# Patient Record
Sex: FEMALE
[De-identification: names, ages, dates, MRNs, and addresses within clinical notes are randomized per-mention and may not be internally consistent; named-entity substitution may affect disease eponyms.]

---

## 2023-05-09 ENCOUNTER — APPOINTMENT (OUTPATIENT)
Dept: OTOLARYNGOLOGY | Facility: CLINIC | Age: 78
End: 2023-05-09
Payer: MEDICARE

## 2023-05-09 VITALS
HEIGHT: 65 IN | TEMPERATURE: 97.8 F | SYSTOLIC BLOOD PRESSURE: 154 MMHG | WEIGHT: 175 LBS | DIASTOLIC BLOOD PRESSURE: 66 MMHG | OXYGEN SATURATION: 96 % | BODY MASS INDEX: 29.16 KG/M2 | HEART RATE: 75 BPM

## 2023-05-09 DIAGNOSIS — I63.9 CEREBRAL INFARCTION, UNSPECIFIED: ICD-10-CM

## 2023-05-09 DIAGNOSIS — Z78.9 OTHER SPECIFIED HEALTH STATUS: ICD-10-CM

## 2023-05-09 DIAGNOSIS — I10 ESSENTIAL (PRIMARY) HYPERTENSION: ICD-10-CM

## 2023-05-09 DIAGNOSIS — H93.13 TINNITUS, BILATERAL: ICD-10-CM

## 2023-05-09 DIAGNOSIS — H61.23 IMPACTED CERUMEN, BILATERAL: ICD-10-CM

## 2023-05-09 DIAGNOSIS — H90.3 SENSORINEURAL HEARING LOSS, BILATERAL: ICD-10-CM

## 2023-05-09 DIAGNOSIS — K27.9 PEPTIC ULCER, SITE UNSPECIFIED, UNSPECIFIED AS ACUTE OR CHRONIC, W/OUT HEMORRHAGE OR PERFORATION: ICD-10-CM

## 2023-05-09 DIAGNOSIS — E04.1 NONTOXIC SINGLE THYROID NODULE: ICD-10-CM

## 2023-05-09 DIAGNOSIS — Z82.49 FAMILY HISTORY OF ISCHEMIC HEART DISEASE AND OTHER DISEASES OF THE CIRCULATORY SYSTEM: ICD-10-CM

## 2023-05-09 DIAGNOSIS — E21.3 HYPERPARATHYROIDISM, UNSPECIFIED: ICD-10-CM

## 2023-05-09 PROBLEM — Z00.00 ENCOUNTER FOR PREVENTIVE HEALTH EXAMINATION: Status: ACTIVE | Noted: 2023-05-09

## 2023-05-09 PROCEDURE — 92550 TYMPANOMETRY & REFLEX THRESH: CPT | Mod: 52

## 2023-05-09 PROCEDURE — 92557 COMPREHENSIVE HEARING TEST: CPT

## 2023-05-09 PROCEDURE — 99204 OFFICE O/P NEW MOD 45 MIN: CPT | Mod: 25

## 2023-05-09 RX ORDER — OMEPRAZOLE 20 MG/1
TABLET, ORALLY DISINTEGRATING, DELAYED RELEASE ORAL
Refills: 0 | Status: ACTIVE | COMMUNITY

## 2023-05-09 RX ORDER — NIFEDIPINE 20 MG/1
CAPSULE ORAL
Refills: 0 | Status: ACTIVE | COMMUNITY

## 2023-05-09 RX ORDER — ATORVASTATIN CALCIUM 80 MG/1
TABLET, FILM COATED ORAL
Refills: 0 | Status: ACTIVE | COMMUNITY

## 2023-05-09 RX ORDER — AMITRIPTYLINE HYDROCHLORIDE 10 MG/1
10 TABLET, FILM COATED ORAL
Qty: 30 | Refills: 2 | Status: ACTIVE | COMMUNITY
Start: 2023-05-09 | End: 1900-01-01

## 2023-05-09 RX ORDER — PROPRANOLOL HCL 120 MG
120 CAPSULE, EXTENDED RELEASE 24HR ORAL
Refills: 0 | Status: ACTIVE | COMMUNITY

## 2023-05-09 NOTE — HISTORY OF PRESENT ILLNESS
[de-identified] : Had a cerumen disimpaction AS at an UC a month ago which worsened pre-existing nonpulsatile tinnitus; she also developed pulsatile tinnitus again only on the L. She tried some lipoflavanoid which helped some. She has longstanding faint R sided nonpulsatile tinnitus. She's been evaluated for her tinnitus in the past & offered maskers which she declined. Denies: vertigo, ear pain, drainage, frequent loud noise exp/shooting, frequent infections, hx of ear surgery or IV antibiotics/chemo; denies a FHx of hearing loss. Qtip use: no\par Last 12/22 her PCP carin some blood that the patient says showed HPT but her Ca was normal; she doesn’t have a copy of this. No hx urolithiasis, abdominal pain, depression, etc. No thyroid sono.

## 2023-05-09 NOTE — DATA REVIEWED
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

## 2023-05-09 NOTE — ASSESSMENT
[FreeTextEntry1] : She finds her tinnitus very bothersome and would like to try a prescription med; RTC 1 month. RTC annually for audios, sooner as needed for worsening hearing or tinnitus.\par \par Thyroid sono & RTC

## 2023-05-09 NOTE — CONSULT LETTER
[Dear  ___] : Dear ~FERNANDO, [Consult Letter:] : I had the pleasure of evaluating your patient, [unfilled]. [Please see my note below.] : Please see my note below. [Consult Closing:] : Thank you very much for allowing me to participate in the care of this patient.  If you have any questions, please do not hesitate to contact me. [Sincerely,] : Sincerely, [FreeTextEntry3] : NATHAN Frost Jr, MD, FAAOHNS\par Otolaryngologist\par [facility]

## 2023-05-09 NOTE — PHYSICAL EXAM
[Binocular Microscopic Exam] : Binocular microscopic exam was performed [Edentulous] : edentulous [Normal] : no masses and lesions seen, face is symmetric [de-identified] : ? subtle deep R thyroid nodule [FreeTextEntry8] : obstructing cerumen removed with a loop [FreeTextEntry9] : obstructing cerumen removed with a loop

## 2023-05-15 LAB
ALBUMIN SERPL ELPH-MCNC: 4.5 G/DL
ALP BLD-CCNC: 117 U/L
ALT SERPL-CCNC: 43 U/L
ANION GAP SERPL CALC-SCNC: 12 MMOL/L
AST SERPL-CCNC: 38 U/L
BILIRUB SERPL-MCNC: 0.3 MG/DL
BUN SERPL-MCNC: 18 MG/DL
CALCIUM SERPL-MCNC: 9.6 MG/DL
CALCIUM SERPL-MCNC: 9.6 MG/DL
CHLORIDE SERPL-SCNC: 104 MMOL/L
CO2 SERPL-SCNC: 23 MMOL/L
CREAT SERPL-MCNC: 0.75 MG/DL
EGFR: 81 ML/MIN/1.73M2
GLUCOSE SERPL-MCNC: 96 MG/DL
PARATHYROID HORMONE INTACT: 55 PG/ML
POTASSIUM SERPL-SCNC: 4.9 MMOL/L
PROT SERPL-MCNC: 7.9 G/DL
SODIUM SERPL-SCNC: 140 MMOL/L
TSH SERPL-ACNC: 0.44 UIU/ML